# Patient Record
Sex: FEMALE | Race: BLACK OR AFRICAN AMERICAN | NOT HISPANIC OR LATINO | URBAN - METROPOLITAN AREA
[De-identification: names, ages, dates, MRNs, and addresses within clinical notes are randomized per-mention and may not be internally consistent; named-entity substitution may affect disease eponyms.]

---

## 2022-12-12 ENCOUNTER — OUT OF OFFICE VISIT (OUTPATIENT)
Dept: URBAN - METROPOLITAN AREA MEDICAL CENTER 34 | Facility: MEDICAL CENTER | Age: 87
End: 2022-12-12
Payer: MEDICARE

## 2022-12-12 DIAGNOSIS — R74.8 ABNORMAL ALKALINE PHOSPHATASE TEST: ICD-10-CM

## 2022-12-12 DIAGNOSIS — R17 CHOLESTATIC JAUNDICE: ICD-10-CM

## 2022-12-12 DIAGNOSIS — R79.89 ABNORMAL BILIRUBIN TEST: ICD-10-CM

## 2022-12-12 DIAGNOSIS — R74.01 ABNORMAL/ELEVATED TRANSAMINASE (SGOT, AMINOTRANSFERASE): ICD-10-CM

## 2022-12-12 PROCEDURE — 99203 OFFICE O/P NEW LOW 30 MIN: CPT | Performed by: PHYSICIAN ASSISTANT

## 2022-12-13 ENCOUNTER — OUT OF OFFICE VISIT (OUTPATIENT)
Dept: URBAN - METROPOLITAN AREA MEDICAL CENTER 34 | Facility: MEDICAL CENTER | Age: 87
End: 2022-12-13
Payer: MEDICARE

## 2022-12-13 DIAGNOSIS — R17 CHOLESTATIC JAUNDICE: ICD-10-CM

## 2022-12-13 DIAGNOSIS — R74.8 ABNORMAL ALKALINE PHOSPHATASE TEST: ICD-10-CM

## 2022-12-13 DIAGNOSIS — R79.89 ABNORMAL BILIRUBIN TEST: ICD-10-CM

## 2022-12-13 DIAGNOSIS — R93.2 ABN FIND-BILIARY TRACT: ICD-10-CM

## 2022-12-13 PROCEDURE — 99214 OFFICE O/P EST MOD 30 MIN: CPT | Performed by: PHYSICIAN ASSISTANT

## 2022-12-29 ENCOUNTER — WEB ENCOUNTER (OUTPATIENT)
Dept: URBAN - METROPOLITAN AREA CLINIC 94 | Facility: CLINIC | Age: 87
End: 2022-12-29

## 2022-12-29 ENCOUNTER — OFFICE VISIT (OUTPATIENT)
Dept: URBAN - METROPOLITAN AREA CLINIC 94 | Facility: CLINIC | Age: 87
End: 2022-12-29
Payer: MEDICARE

## 2022-12-29 ENCOUNTER — DASHBOARD ENCOUNTERS (OUTPATIENT)
Age: 87
End: 2022-12-29

## 2022-12-29 ENCOUNTER — LAB OUTSIDE AN ENCOUNTER (OUTPATIENT)
Dept: URBAN - METROPOLITAN AREA CLINIC 94 | Facility: CLINIC | Age: 87
End: 2022-12-29

## 2022-12-29 VITALS
HEART RATE: 74 BPM | SYSTOLIC BLOOD PRESSURE: 140 MMHG | WEIGHT: 210 LBS | HEIGHT: 61 IN | DIASTOLIC BLOOD PRESSURE: 63 MMHG | TEMPERATURE: 98.4 F | BODY MASS INDEX: 39.65 KG/M2

## 2022-12-29 DIAGNOSIS — R17 JAUNDICE: ICD-10-CM

## 2022-12-29 DIAGNOSIS — R10.10 UPPER ABDOMINAL PAIN: ICD-10-CM

## 2022-12-29 DIAGNOSIS — R11.0 NAUSEA: ICD-10-CM

## 2022-12-29 DIAGNOSIS — K77 LIVER DISORDERS IN DISEASES CLASSIFIED ELSEWHERE: ICD-10-CM

## 2022-12-29 DIAGNOSIS — E85.4 ORGAN-LIMITED AMYLOIDOSIS: ICD-10-CM

## 2022-12-29 PROBLEM — 9551004: Status: ACTIVE | Noted: 2022-12-29

## 2022-12-29 PROBLEM — 235856003: Status: ACTIVE | Noted: 2022-12-29

## 2022-12-29 PROCEDURE — 99203 OFFICE O/P NEW LOW 30 MIN: CPT | Performed by: INTERNAL MEDICINE

## 2022-12-29 RX ORDER — PRAVASTATIN SODIUM 80 MG/1
1 TABLET TABLET ORAL ONCE A DAY
Qty: 30 | Status: ACTIVE | COMMUNITY
Start: 2022-12-29

## 2022-12-29 RX ORDER — PHENYTOIN SODIUM 100 MG/1
TAKE 1 CAPSULE BY MOUTH EVERY DAY CAPSULE ORAL
Qty: 90 EACH | Refills: 2 | Status: ACTIVE | COMMUNITY

## 2022-12-29 RX ORDER — SITAGLIPTIN 100 MG/1
1 TABLET TABLET, FILM COATED ORAL ONCE A DAY
Qty: 30 | Status: ACTIVE | COMMUNITY
Start: 2022-12-29

## 2022-12-29 RX ORDER — LEVOTHYROXINE SODIUM 88 UG/1
TAKE 1 TABLET BY MOUTH IN THE MORNING TABLET ORAL
Qty: 90 EACH | Refills: 1 | Status: ACTIVE | COMMUNITY

## 2022-12-29 RX ORDER — AMLODIPINE BESYLATE 10 MG/1
1 TABLET TABLET ORAL ONCE A DAY
Status: ACTIVE | COMMUNITY

## 2022-12-29 RX ORDER — FUROSEMIDE 40 MG/1
TAKE 1 TABLET BY MOUTH EVERY DAY TABLET ORAL
Qty: 90 EACH | Refills: 2 | Status: ACTIVE | COMMUNITY

## 2022-12-29 RX ORDER — POTASSIUM CHLORIDE 1500 MG/1
TABLET, EXTENDED RELEASE ORAL
Qty: 90 TABLET | Status: ACTIVE | COMMUNITY

## 2022-12-29 RX ORDER — CALCITRIOL 0.25 UG/1
1 CAPSULE CAPSULE ORAL
Status: ACTIVE | COMMUNITY

## 2022-12-29 RX ORDER — METOPROLOL SUCCINATE 25 MG/1
TAKE 1 TABLET (25 MG TOTAL) BY MOUTH DAILY TABLET, EXTENDED RELEASE ORAL
Qty: 90 EACH | Refills: 2 | Status: ACTIVE | COMMUNITY

## 2022-12-29 RX ORDER — FAMOTIDINE 20 MG/1
TAKE 1 TABLET BY MOUTH EVERY DAY TABLET, FILM COATED ORAL
Qty: 90 EACH | Refills: 1 | Status: ACTIVE | COMMUNITY

## 2022-12-29 NOTE — HPI-TODAY'S VISIT:
Admitted to [facility] early this month for Jaundice Bili ~ 7. Alk phos>1000. Serologies and imaging all new. Liver biopsy-Amyloidosis. Seen by Heme/Onc yesterday. Has some pain in upper abdomen

## 2022-12-30 LAB
A/G RATIO: 1.1
ABSOLUTE BASOPHILS: 41
ABSOLUTE EOSINOPHILS: 96
ABSOLUTE LYMPHOCYTES: 3987
ABSOLUTE MONOCYTES: 1343
ABSOLUTE NEUTROPHILS: 8234
ALBUMIN: 3.2
ALKALINE PHOSPHATASE: 1423
ALT (SGPT): 71
AST (SGOT): 221
BASOPHILS: 0.3
BILIRUBIN, TOTAL: 10.5
BUN/CREATININE RATIO: 11
BUN: 17
C-REACTIVE PROTEIN, QUANT: 61.7
CALCIUM: 9.6
CARBON DIOXIDE, TOTAL: 34
CHLORIDE: 97
CREATININE: 1.49
EGFR: 34
EOSINOPHILS: 0.7
GLOBULIN, TOTAL: 2.8
GLUCOSE: 78
HEMATOCRIT: 37.1
HEMOGLOBIN: 12.8
LYMPHOCYTES: 29.1
MCH: 28.9
MCHC: 34.5
MCV: 83.7
MONOCYTES: 9.8
MPV: 11.1
NEUTROPHILS: 60.1
PLATELET COUNT: 294
POTASSIUM: 4.7
PROTEIN, TOTAL: 6
RDW: 17.2
RED BLOOD CELL COUNT: 4.43
SED RATE BY MODIFIED: 53
SODIUM: 140
T4, FREE: 1.5
WHITE BLOOD CELL COUNT: 13.7

## 2023-01-03 ENCOUNTER — TELEPHONE ENCOUNTER (OUTPATIENT)
Dept: URBAN - METROPOLITAN AREA CLINIC 52 | Facility: CLINIC | Age: 88
End: 2023-01-03

## 2023-05-03 ENCOUNTER — TELEPHONE ENCOUNTER (OUTPATIENT)
Dept: URBAN - METROPOLITAN AREA CLINIC 52 | Facility: CLINIC | Age: 88
End: 2023-05-03

## 2023-05-05 ENCOUNTER — TELEPHONE ENCOUNTER (OUTPATIENT)
Dept: URBAN - METROPOLITAN AREA CLINIC 52 | Facility: CLINIC | Age: 88
End: 2023-05-05